# Patient Record
(demographics unavailable — no encounter records)

---

## 2025-05-23 NOTE — PHYSICAL EXAM
[Well Developed] : well developed [Well Nourished] : well nourished [No Acute Distress] : no acute distress [Normal Conjunctiva] : normal conjunctiva [Normal Venous Pressure] : normal venous pressure [No Carotid Bruit] : no carotid bruit [Normal S1, S2] : normal S1, S2 [No Murmur] : no murmur [No Rub] : no rub [No Gallop] : no gallop [Clear Lung Fields] : clear lung fields [Good Air Entry] : good air entry [No Respiratory Distress] : no respiratory distress  [Soft] : abdomen soft [Non Tender] : non-tender [No Masses/organomegaly] : no masses/organomegaly [Normal Bowel Sounds] : normal bowel sounds [No Edema] : no edema [No Cyanosis] : no cyanosis [No Clubbing] : no clubbing [No Varicosities] : no varicosities [No Rash] : no rash [No Skin Lesions] : no skin lesions [Moves all extremities] : moves all extremities [No Focal Deficits] : no focal deficits [Normal Speech] : normal speech [Alert and Oriented] : alert and oriented [Normal memory] : normal memory [de-identified] : walks with cane

## 2025-05-23 NOTE — REVIEW OF SYSTEMS
[SOB] : shortness of breath [Dyspnea on exertion] : dyspnea during exertion [Joint Pain] : joint pain [Joint Swelling] : joint swelling [Joint Stiffness] : joint stiffness [Dizziness] : dizziness [Anxiety] : anxiety [Under Stress] : under stress [Negative] : Heme/Lymph [FreeTextEntry9] : walks with cane

## 2025-05-23 NOTE — PHYSICAL EXAM
[Well Developed] : well developed [Well Nourished] : well nourished [No Acute Distress] : no acute distress [Normal Conjunctiva] : normal conjunctiva [Normal Venous Pressure] : normal venous pressure [No Carotid Bruit] : no carotid bruit [Normal S1, S2] : normal S1, S2 [No Murmur] : no murmur [No Rub] : no rub [No Gallop] : no gallop [Clear Lung Fields] : clear lung fields [Good Air Entry] : good air entry [No Respiratory Distress] : no respiratory distress  [Soft] : abdomen soft [Non Tender] : non-tender [No Masses/organomegaly] : no masses/organomegaly [Normal Bowel Sounds] : normal bowel sounds [No Edema] : no edema [No Cyanosis] : no cyanosis [No Clubbing] : no clubbing [No Varicosities] : no varicosities [No Rash] : no rash [No Skin Lesions] : no skin lesions [Moves all extremities] : moves all extremities [No Focal Deficits] : no focal deficits [Normal Speech] : normal speech [Alert and Oriented] : alert and oriented [Normal memory] : normal memory [de-identified] : walks with cane

## 2025-05-23 NOTE — REASON FOR VISIT
[Symptom and Test Evaluation] : symptom and test evaluation [CV Risk Factors and Non-Cardiac Disease] : CV risk factors and non-cardiac disease [Hyperlipidemia] : hyperlipidemia [Hypertension] : hypertension [FreeTextEntry1] : I had the pleasure of evaluating Ms. Hannah Almodovar who presents for her initial cardiovascular consultation.  She is a 77-year-old woman with HTN, HLD, and OA (knees).  She had previously presented to the [facility] ED with complaints of dizziness associated with nausea and photophobia. CT head revealed mild chronic microvascular ischemic changes but no acute intracranial pathology. CTA head was negative for large vessel occlusion, significant stenosis, or aneurysm. EKG showed sinus bradycardia with sinus arrhythmia.  Her symptoms remain intermittent, but she now complains of having exertional dyspnea as well.  She has not had a recent cardiac assessment.  Physical exam was unremarkable She appears euvolemic.  At this time, we will arrange for: 1. TTE 2. Pharmacologic NST (she cannot ambulate). 3. Carotid artery US 4. F/U after testing to review results and updated recommendations.     Patient HANNAH ALMODOVAR [facility] MRN [id number] Hospital [facility] [id number] [facility] - Attending Physician Kuldeep Dobbs  Hospital Course: Discharge Date 2025 Admission Date 2025 16:06 Reason for Admission Vertigo  Hospital Course   This 77-year-old female presented with a five-day history of room-spinning dizziness, associated with nausea and photophobia. She reports a past medical history of hypertension and vertigo, with infrequent exacerbations. She had previously used meclizine, but her supply had . She reported that the meclizine she tried at home did not alleviate her symptoms, but the dose administered in the ED provided some relief.  A non-contrast CT head and CT angiogram (CTA) of the brain and neck were performed. The CT head revealed mild chronic microvascular ischemic changes but no acute intracranial pathology. The CTA was negative for large vessel occlusion, significant stenosis, or aneurysm. Incidentally, multiple small right thyroid nodules were noted, and a follow-up thyroid ultrasound is recommended. An EKG showed sinus bradycardia with sinus arrhythmia.  The patient's symptoms were managed with meclizine, which provided partial relief. Physical therapy was consulted and recommended home physical therapy to address balance and vestibular rehabilitation. The patient's symptoms improved throughout her hospital stay with the combination of meclizine and physical therapy.  Discharge diagnoses Vertigo HTN Depression HLD Uterine cancer s/p XRT 33 sessions finished 11 years ago Sister with PPM  Patient seen and evaluated. DC home.  DC time 37 mins      Med Reconciliation:  Medication Reconciliation Status Admission Reconciliation is Not Complete  Discharge Reconciliation is Completed  Discharge Medications acetaminophen 325 mg oral tablet: 2 tab(s) orally every 6  hours As needed Mild Pain (1 - 3), Moderate Pain (4 - 6)  ESCITALOPRAM 20MG TABLETS: 1 tab(s) orally once a day TAKE 1 TABLET BY MOUTH  DAILY  lisinopril 20 mg oral tablet: 1 tab(s) orally once a day (at bedtime)  simvastatin 10 mg oral tablet: 1 tab(s) orally once a day (at bedtime)  Travel-Ease 25 mg oral tablet: 1 tab(s) orally every 6 hours as needed for  dizziness  ,  ,  Care Plan/Procedures:  Discharge Diagnoses, Assessment and Plan of Treatment PRINCIPAL DISCHARGE  DIAGNOSIS  Diagnosis: Vertigo  Assessment and Plan of Treatment: Treated with meclizine  Can continue to use as need for dizziness  Please follow up with neurology outpatient      SECONDARY DISCHARGE DIAGNOSES  Diagnosis: Thyroid nodule  Assessment and Plan of Treatment: Incidentally seen on imaging  You should follow up with your PCP to get a thyroid US to properly evaluate  Goal(s) To get better and follow your care plan as instructed.  Follow Up:  Care Providers for Follow up (PCP/Outpatient Provider) your pcp,  Phone: ( )  -  Fax: ( )  -  Follow Up Time:    Sekou Baldwin)  Neurology  [address], [address]  [place] [address]  Phone: [phone]  Fax: [phone]  Follow Up Time: Routine  Follow-up Clinics A Neurologist  Neurology  .  [place]  Phone:  Fax:  Additional Provider Info (For SysAdmin Use Only) FREE:[LAST:[your pcp],PHONE:[(  )  -],FAX:[( )  -]],PROVIDER:[TOKEN:[[id number]],FOLLOWUP:[Routine]]  Care Providers Direct Addresses (For SYSAdmin Use Only)   ,DirectAddress_Unknown,DirectAddress_Unknown  Follow-up Clinics (For SysAdmin Use Only) [id number]:  F F||False;  NPI number (For SysAdmin Use Only) : [UNKNOWN],[[id number]]  Additional Scheduled Appointments It is important to see your primary physician  as well as the physicians noted below within the next week to perform a  comprehensive medical review.  Call their offices for an appointment as soon as  you leave the hospital.  You will also need to see them for renewal of your  medications.  If you do not have a primary physician, contact the [facility]  Physician Referral Service at [phone].  To obtain your results, you can  access the [facility] Patient Portal at  [email or web address].  Your medical issues appear to be stable  at this time, but if your symptoms recur or worsen, contact your physicians  and/or return to the hospital if necessary.  If you encounter any issues or  questions with your medication, call your physicians before stopping the  medication.    APPTS ARE READY TO BE MADE: [X] YES    Best Family or Patient Contact (if needed):    Additional Information about above appointments (if needed):    1: neuro  2: pcp  3:    Other comments or requests:  NEURO/PCP - Patient advises they do not want our assistance and prefer to  coordinate the non - [facility] appointments on their own. No information was  provided to the patient.    Discharge Diet DASH Diet  Activity Activity as tolerated  Quality Measures:  Hospice Patient No  Core Measure Site Yes  Does the patient have a principal diagnosis of ischemic stroke, hemorrhagic  stroke, or TIA? No  Does the patient have a principal diagnosis of Acute Myocardial Infarction?              No  Has the patient had a Percutaneous Coronary Intervention? No  Tobacco Usage Within the Last Year No  Document Complete:  Care Provider Seen in [facility]  Physician Section Complete This document is complete and the patient is ready  for discharge.  For questions about your prescriptions, please call: [phone]  Is this contact telephone number correct? Yes  Attending Attestation Statement I have personally seen and examined the  patient. I have collaborated with and supervised the  . on the discharge service for the patient. I have reviewed and made amendments  to the documentation where necessary.

## 2025-05-23 NOTE — REASON FOR VISIT
[Symptom and Test Evaluation] : symptom and test evaluation [CV Risk Factors and Non-Cardiac Disease] : CV risk factors and non-cardiac disease [Hyperlipidemia] : hyperlipidemia [Hypertension] : hypertension [FreeTextEntry1] : I had the pleasure of evaluating Ms. Hannah Almodovar who presents for her initial cardiovascular consultation.  She is a 77-year-old woman with HTN, HLD, and OA (knees).  She had previously presented to the [facility] ED with complaints of dizziness associated with nausea and photophobia. CT head revealed mild chronic microvascular ischemic changes but no acute intracranial pathology. CTA head was negative for large vessel occlusion, significant stenosis, or aneurysm. EKG showed sinus bradycardia with sinus arrhythmia.  Her symptoms remain intermittent, but she now complains of having exertional dyspnea as well.  She has not had a recent cardiac assessment.  Physical exam was unremarkable She appears euvolemic.  At this time, we will arrange for: 1. TTE 2. Pharmacologic NST (she cannot ambulate). 3. Carotid artery US 4. F/U after testing to review results and updated recommendations.     Patient HANNAH ALMODOVAR [facility] MRN [id number] Hospital Visit [id number] [facility] Hospital - Attending Physician Kuldeep Dobbs  Hospital Course: Discharge Date 2025 Admission Date 2025 16:06 Reason for Admission Vertigo  Hospital Course   This 77-year-old female presented with a five-day history of room-spinning dizziness, associated with nausea and photophobia. She reports a past medical history of hypertension and vertigo, with infrequent exacerbations. She had previously used meclizine, but her supply had . She reported that the meclizine she tried at home did not alleviate her symptoms, but the dose administered in the ED provided some relief.  A non-contrast CT head and CT angiogram (CTA) of the brain and neck were performed. The CT head revealed mild chronic microvascular ischemic changes but no acute intracranial pathology. The CTA was negative for large vessel occlusion, significant stenosis, or aneurysm. Incidentally, multiple small right thyroid nodules were noted, and a follow-up thyroid ultrasound is recommended. An EKG showed sinus bradycardia with sinus arrhythmia.  The patient's symptoms were managed with meclizine, which provided partial relief. Physical therapy was consulted and recommended home physical therapy to address balance and vestibular rehabilitation. The patient's symptoms improved throughout her hospital stay with the combination of meclizine and physical therapy.  Discharge diagnoses Vertigo HTN Depression HLD Uterine cancer s/p XRT 33 sessions finished 11 years ago Sister with PPM  Patient seen and evaluated. DC home.  DC time 37 mins      Med Reconciliation:  Medication Reconciliation Status Admission Reconciliation is Not Complete  Discharge Reconciliation is Completed  Discharge Medications acetaminophen 325 mg oral tablet: 2 tab(s) orally every 6  hours As needed Mild Pain (1 - 3), Moderate Pain (4 - 6)  ESCITALOPRAM 20MG TABLETS: 1 tab(s) orally once a day TAKE 1 TABLET BY MOUTH  DAILY  lisinopril 20 mg oral tablet: 1 tab(s) orally once a day (at bedtime)  simvastatin 10 mg oral tablet: 1 tab(s) orally once a day (at bedtime)  Travel-Ease 25 mg oral tablet: 1 tab(s) orally every 6 hours as needed for  dizziness  ,  ,  Care Plan/Procedures:  Discharge Diagnoses, Assessment and Plan of Treatment PRINCIPAL DISCHARGE  DIAGNOSIS  Diagnosis: Vertigo  Assessment and Plan of Treatment: Treated with meclizine  Can continue to use as need for dizziness  Please follow up with neurology outpatient      SECONDARY DISCHARGE DIAGNOSES  Diagnosis: Thyroid nodule  Assessment and Plan of Treatment: Incidentally seen on imaging  You should follow up with your PCP to get a thyroid US to properly evaluate  Goal(s) To get better and follow your care plan as instructed.  Follow Up:  Care Providers for Follow up (PCP/Outpatient Provider) your pcp,  Phone: ( )  -  Fax: ( )  -  Follow Up Time:    Sekou Baldwin)  Neurology  [address], [address]  [place] [address]  Phone: [phone]  Fax: [phone]  Follow Up Time: Routine  Follow-up Clinics A Neurologist  Neurology  .  [place]  Phone:  Fax:  Additional Provider Info (For SysAdmin Use Only) FREE:[LAST:[your pcp],PHONE:[(  )  -],FAX:[( )  -]],PROVIDER:[TOKEN:[[id number]],FOLLOWUP:[Routine]]  Care Providers Direct Addresses (For SYSAdmin Use Only)   ,DirectAddress_Unknown,DirectAddress_Unknown  Follow-up Clinics (For SysAdmin Use Only) [id number]:  F F||False;  NPI number (For SysAdmin Use Only) : [UNKNOWN],[[id number]]  Additional Scheduled Appointments It is important to see your primary physician  as well as the physicians noted below within the next week to perform a  comprehensive medical review.  Call their offices for an appointment as soon as  you leave the hospital.  You will also need to see them for renewal of your  medications.  If you do not have a primary physician, contact the [facility]  Physician Referral Service at [phone].  To obtain your results, you can  access the [facility] Patient Portal at  [email or web address].  Your medical issues appear to be stable  at this time, but if your symptoms recur or worsen, contact your physicians  and/or return to the hospital if necessary.  If you encounter any issues or  questions with your medication, call your physicians before stopping the  medication.    APPTS ARE READY TO BE MADE: [X] YES    Best Family or Patient Contact (if needed):    Additional Information about above appointments (if needed):    1: neuro  2: pcp  3:    Other comments or requests:  NEURO/PCP - Patient advises they do not want our assistance and prefer to  coordinate the non - [facility] appointments on their own. No information was  provided to the patient.    Discharge Diet DASH Diet  Activity Activity as tolerated  Quality Measures:  Hospice Patient No  Core Measure Site Yes  Does the patient have a principal diagnosis of ischemic stroke, hemorrhagic  stroke, or TIA? No  Does the patient have a principal diagnosis of Acute Myocardial Infarction?              No  Has the patient had a Percutaneous Coronary Intervention? No  Tobacco Usage Within the Last Year No  Document Complete:  Care Provider Seen in [facility]  Physician Section Complete This document is complete and the patient is ready  for discharge.  For questions about your prescriptions, please call: [phone]  Is this contact telephone number correct? Yes  Attending Attestation Statement I have personally seen and examined the  patient. I have collaborated with and supervised the  . on the discharge service for the patient. I have reviewed and made amendments  to the documentation where necessary.